# Patient Record
Sex: FEMALE | Race: WHITE | NOT HISPANIC OR LATINO
[De-identification: names, ages, dates, MRNs, and addresses within clinical notes are randomized per-mention and may not be internally consistent; named-entity substitution may affect disease eponyms.]

---
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
## 2021-03-12 ENCOUNTER — APPOINTMENT (OUTPATIENT)
Dept: UROLOGY | Facility: CLINIC | Age: 21
End: 2021-03-12
Payer: COMMERCIAL

## 2021-03-12 VITALS — TEMPERATURE: 98.8 F | DIASTOLIC BLOOD PRESSURE: 70 MMHG | SYSTOLIC BLOOD PRESSURE: 110 MMHG

## 2021-03-12 PROCEDURE — 83986 ASSAY PH BODY FLUID NOS: CPT | Mod: QW

## 2021-03-12 PROCEDURE — 87210 SMEAR WET MOUNT SALINE/INK: CPT | Mod: QW

## 2021-03-12 PROCEDURE — 99215 OFFICE O/P EST HI 40 MIN: CPT

## 2021-03-12 PROCEDURE — 99072 ADDL SUPL MATRL&STAF TM PHE: CPT

## 2021-03-12 RX ORDER — SULFAMETHOXAZOLE AND TRIMETHOPRIM 800; 160 MG/1; MG/1
800-160 TABLET ORAL TWICE DAILY
Qty: 6 | Refills: 0 | Status: COMPLETED | COMMUNITY
Start: 2020-10-23 | End: 2021-03-12

## 2021-03-17 ENCOUNTER — NON-APPOINTMENT (OUTPATIENT)
Age: 21
End: 2021-03-17

## 2021-03-23 ENCOUNTER — NON-APPOINTMENT (OUTPATIENT)
Age: 21
End: 2021-03-23

## 2021-05-05 ENCOUNTER — APPOINTMENT (OUTPATIENT)
Dept: UROLOGY | Facility: CLINIC | Age: 21
End: 2021-05-05
Payer: COMMERCIAL

## 2021-05-05 VITALS — TEMPERATURE: 98.6 F

## 2021-05-05 PROCEDURE — 99215 OFFICE O/P EST HI 40 MIN: CPT

## 2021-05-05 PROCEDURE — 99072 ADDL SUPL MATRL&STAF TM PHE: CPT

## 2021-05-05 RX ORDER — FLUCONAZOLE 150 MG/1
150 TABLET ORAL
Qty: 4 | Refills: 0 | Status: COMPLETED | COMMUNITY
Start: 2021-03-12 | End: 2021-05-05

## 2021-05-05 RX ORDER — METHOCARBAMOL 750 MG/1
750 TABLET, FILM COATED ORAL TWICE DAILY
Qty: 60 | Refills: 2 | Status: COMPLETED | COMMUNITY
Start: 2020-12-04 | End: 2021-05-05

## 2021-05-05 RX ORDER — PHENAZOPYRIDINE HYDROCHLORIDE 200 MG/1
200 TABLET ORAL
Qty: 30 | Refills: 1 | Status: COMPLETED | COMMUNITY
Start: 2020-10-23 | End: 2021-05-05

## 2021-05-05 RX ORDER — DOXYCYCLINE HYCLATE 100 MG/1
100 CAPSULE ORAL TWICE DAILY
Qty: 20 | Refills: 0 | Status: COMPLETED | COMMUNITY
Start: 2021-03-12 | End: 2021-05-05

## 2021-05-28 ENCOUNTER — APPOINTMENT (OUTPATIENT)
Dept: OBGYN | Facility: CLINIC | Age: 21
End: 2021-05-28
Payer: COMMERCIAL

## 2021-05-28 PROCEDURE — 99072 ADDL SUPL MATRL&STAF TM PHE: CPT

## 2021-05-28 PROCEDURE — 99203 OFFICE O/P NEW LOW 30 MIN: CPT

## 2021-06-11 ENCOUNTER — APPOINTMENT (OUTPATIENT)
Dept: UROLOGY | Facility: CLINIC | Age: 21
End: 2021-06-11

## 2021-08-03 ENCOUNTER — APPOINTMENT (OUTPATIENT)
Dept: UROLOGY | Facility: CLINIC | Age: 21
End: 2021-08-03

## 2021-09-14 ENCOUNTER — APPOINTMENT (OUTPATIENT)
Dept: UROGYNECOLOGY | Facility: CLINIC | Age: 21
End: 2021-09-14
Payer: COMMERCIAL

## 2021-09-14 VITALS — TEMPERATURE: 98.1 F | SYSTOLIC BLOOD PRESSURE: 124 MMHG | DIASTOLIC BLOOD PRESSURE: 81 MMHG

## 2021-09-14 PROCEDURE — 99214 OFFICE O/P EST MOD 30 MIN: CPT

## 2021-11-16 ENCOUNTER — APPOINTMENT (OUTPATIENT)
Dept: UROGYNECOLOGY | Facility: CLINIC | Age: 21
End: 2021-11-16
Payer: COMMERCIAL

## 2021-11-16 PROCEDURE — 99214 OFFICE O/P EST MOD 30 MIN: CPT

## 2022-02-15 ENCOUNTER — APPOINTMENT (OUTPATIENT)
Dept: UROGYNECOLOGY | Facility: CLINIC | Age: 22
End: 2022-02-15

## 2022-10-19 ENCOUNTER — APPOINTMENT (OUTPATIENT)
Dept: UROGYNECOLOGY | Facility: CLINIC | Age: 22
End: 2022-10-19

## 2022-10-19 VITALS — SYSTOLIC BLOOD PRESSURE: 119 MMHG | DIASTOLIC BLOOD PRESSURE: 78 MMHG

## 2022-10-19 DIAGNOSIS — B37.31 ACUTE CANDIDIASIS OF VULVA AND VAGINA: ICD-10-CM

## 2022-10-19 PROCEDURE — 87210 SMEAR WET MOUNT SALINE/INK: CPT | Mod: QW

## 2022-10-19 PROCEDURE — 99214 OFFICE O/P EST MOD 30 MIN: CPT

## 2022-10-19 PROCEDURE — 83986 ASSAY PH BODY FLUID NOS: CPT | Mod: QW

## 2022-10-19 RX ORDER — TRIAMCINOLONE ACETONIDE 1 MG/G
0.1 OINTMENT TOPICAL
Qty: 1 | Refills: 0 | Status: COMPLETED | COMMUNITY
Start: 2021-09-14 | End: 2022-10-19

## 2022-10-19 RX ORDER — MONTELUKAST 10 MG/1
10 TABLET, FILM COATED ORAL DAILY
Qty: 30 | Refills: 5 | Status: COMPLETED | COMMUNITY
Start: 2020-10-02 | End: 2022-10-19

## 2022-10-19 NOTE — ASSESSMENT
[FreeTextEntry1] : Wet prep done in office\par pH=4.0, +lacto, +epithelial cells, ++hyphae, no clue cells, no whiff, no trich, no WBC, no parabasal cells\par KOH prep + hyphae\par \par Fluconazole 200mg 1 po q3d x 3 doses.\par \par Retry baclofen 20mg po qam. If unable to tolerate, I told Maile to call me and I will change her muscle relaxant. \par \par I strongly encouraged Maile to start PFPT. She has had issues with lack of insurance coverage. I suggested she speak with [employer] and ask them about paying out of pocket. \par \par RTO 3 months\par

## 2022-10-19 NOTE — PHYSICAL EXAM
[Chaperone Present] : A chaperone was present in the examining room during all aspects of the physical examination [No Acute Distress] : in no acute distress [Well developed] : well developed [Well Nourished] : ~L well nourished [Good Hygeine] : demonstrates good hygeine [Oriented x3] : oriented to person, place, and time [Normal Memory] : ~T memory was ~L unimpaired [Normal Mood/Affect] : mood and affect are normal [Obese] : obese [Warm and Dry] : was warm and dry to touch [Normal Gait] : gait was normal [No Lesions] : no lesions were seen on the external genitalia [Labia Majora] : were normal [Labia Minora] : were normal [Normal Appearance] : general appearance was normal [Pink Rugae] : pink rugae [No Bleeding] : there was no active vaginal bleeding [Normal] : no abnormalities [Exam Deferred] : was deferred [FreeTextEntry1] : Silva ALFORD RN [Tenderness] : ~T no ~M abdominal tenderness observed [Distended] : not distended [de-identified] : Q-tip touch test 2/10 @ 5,6,7. No erythema, ulcerations, erosions [FreeTextEntry4] : PFMs 4/4 with MTrPs b/l levators, all groups; +taut bands, +severe pain to palpation

## 2022-10-19 NOTE — HISTORY OF PRESENT ILLNESS
[FreeTextEntry1] : Maile is here for a f/u visit after a year hiatus. \par \par Not using anything for her vulvar pain or PFD.\par Since her last appointment, not using any topical creams.\par Was taking baclofen 10mg but she couldn't fall asleep. Took x 5-6 months, did not notice significant improvement. \par Tried methocarbamol which didn't help. \par She endorses lower abdominal pressure which makes her feel like she has a UTI. She has been tested repeatedly and all cultures have been negative.\par She recently had an X-RAY of her lower back and pelvis b/c she has been having severe back pain. X-ray demonstrated that her R hip is higher than her L hip.\par \par She has attempted intercourse but was unable to achieve penetration 2/2 pain. She states it feels like her partner is hitting a wall.

## 2022-10-20 ENCOUNTER — NON-APPOINTMENT (OUTPATIENT)
Age: 22
End: 2022-10-20

## 2022-11-08 ENCOUNTER — NON-APPOINTMENT (OUTPATIENT)
Age: 22
End: 2022-11-08

## 2022-11-10 ENCOUNTER — NON-APPOINTMENT (OUTPATIENT)
Age: 22
End: 2022-11-10

## 2022-11-11 LAB
APPEARANCE: CLEAR
BACTERIA: NEGATIVE
BILIRUBIN URINE: NEGATIVE
BLOOD URINE: NEGATIVE
COLOR: NORMAL
GLUCOSE QUALITATIVE U: NEGATIVE
HYALINE CASTS: 0 /LPF
KETONES URINE: NEGATIVE
LEUKOCYTE ESTERASE URINE: NEGATIVE
MICROSCOPIC-UA: NORMAL
NITRITE URINE: NEGATIVE
PH URINE: 7
PROTEIN URINE: NORMAL
RED BLOOD CELLS URINE: 1 /HPF
SPECIFIC GRAVITY URINE: 1.03
SQUAMOUS EPITHELIAL CELLS: 3 /HPF
UROBILINOGEN URINE: NORMAL
WHITE BLOOD CELLS URINE: 2 /HPF

## 2022-11-15 LAB — BACTERIA UR CULT: NORMAL

## 2022-11-22 ENCOUNTER — NON-APPOINTMENT (OUTPATIENT)
Age: 22
End: 2022-11-22

## 2022-11-28 ENCOUNTER — NON-APPOINTMENT (OUTPATIENT)
Age: 22
End: 2022-11-28

## 2022-12-07 ENCOUNTER — APPOINTMENT (OUTPATIENT)
Dept: UROGYNECOLOGY | Facility: CLINIC | Age: 22
End: 2022-12-07

## 2022-12-07 ENCOUNTER — NON-APPOINTMENT (OUTPATIENT)
Age: 22
End: 2022-12-07

## 2023-01-11 ENCOUNTER — APPOINTMENT (OUTPATIENT)
Dept: UROGYNECOLOGY | Facility: CLINIC | Age: 23
End: 2023-01-11
Payer: COMMERCIAL

## 2023-01-11 VITALS — DIASTOLIC BLOOD PRESSURE: 86 MMHG | SYSTOLIC BLOOD PRESSURE: 136 MMHG

## 2023-01-11 DIAGNOSIS — M62.838 OTHER MUSCLE SPASM: ICD-10-CM

## 2023-01-11 PROCEDURE — 83986 ASSAY PH BODY FLUID NOS: CPT | Mod: QW

## 2023-01-11 PROCEDURE — 87210 SMEAR WET MOUNT SALINE/INK: CPT | Mod: QW

## 2023-01-11 PROCEDURE — 99214 OFFICE O/P EST MOD 30 MIN: CPT

## 2023-01-11 RX ORDER — BACLOFEN 20 MG/1
20 TABLET ORAL
Qty: 30 | Refills: 3 | Status: COMPLETED | COMMUNITY
Start: 2021-09-14 | End: 2023-01-11

## 2023-01-11 RX ORDER — FLUCONAZOLE 200 MG/1
200 TABLET ORAL
Qty: 3 | Refills: 0 | Status: COMPLETED | COMMUNITY
Start: 2022-10-19 | End: 2023-01-11

## 2023-01-11 NOTE — HISTORY OF PRESENT ILLNESS
[FreeTextEntry1] : Maile is here for a f/u visit for vulvar irritation and discharge.\par \par Every time she has intercourse she gets infection symptoms (all cultures negative). She reports vulvar swelling, itching, erythema, yellow discharge. \par Stopped using lube and latex condoms. \par She is wearing 100% white cotton underwear.\par She is not using soap or body washes.\par \par She is still having intercourse with some difficulty. She denies any vulvodynia symptoms. She feels her PFMs tighten up and then has pain.\par She tried 0.25mg xanax once prior to intercourse and was able to relax and have pain free sex.\par She does not think baclofen is working for her PFMs.\par She went for consult for PFPT and has first appointment on Friday.\par \par

## 2023-01-11 NOTE — DISCUSSION/SUMMARY
[FreeTextEntry1] : I had a long discussion with Maile today re: irritant contact derm, normal physiologic discharge vs. infection.\par \par Wet prep done in office\par pH=4.0, +lacto, +epithelial cells, no hyphae, no clue cells, no whiff, no trich, no WBC, no parabasal cells\par KOH prep neg hyphae\par BV Affirm sent today.\par \par We discussed alternative non-latex, fragrance and spermicide free condoms she can try.\par Continue genital hygiene measures.\par \par We discussed the effects of altered pH on the vaginal microbiome and risk for urinary and vaginal infections and vulvovaginal pain. I suggested that she use GCL BipHresh suppository PV 2-3x/week, balance wash and restore lubricant. I explained this is not FDA approved, however, she can go on their website and read literature from WHO and NIH regarding it's use in helping to maintain healthy vaginal ecosystem.\par \par White Crisco to vulva as needed for irritation.\par \par Mometasone 0.1% ointment pea-size amt to areas of irritation (labia majora, interlabial sulci) after bath or shower BID x 2 weeks then QD x 4 weeks then 2-3x/week.\par \par Continue PFM relaxation techniques.\par Keep PFPT appointment.\par d/c baclofen\par \par Diazepam 2mg 1 tab po QHS and may take 1 tab 60 mins prior to intercourse. #60, no refills to pharmacy. ISTOP checked. SE including but not limited to dizziness, drowsiness, h/a, constipation, depression, irritability and need to taper off medication rev'd with patient. Maile understands that benzodiazepines are a CS, even in suppository form. She understands the potential for dependence/abuse, drug tolerance, potential for addiction. She understands that this class of drugs is habit-forming and [facility] regulated. The sedative effects of benzos can be potentiated by taking alcohol, sleeping pills, opioids. The decision to drive is the patient's responsibility, as benzos can affect her driving ability and ability to concentrate. The goal is to wean off benzos. The patient verbalized understanding of everything we discussed and would like to continue with medication at this time. She also understands that in order to continue to get refills on her diazepam prescription, she needs to be seen in the office at minimum every 3 months.\par \par \par \par \par \par

## 2023-01-11 NOTE — PHYSICAL EXAM
[No Acute Distress] : in no acute distress [Well developed] : well developed [Well Nourished] : ~L well nourished [Good Hygeine] : demonstrates good hygeine [Oriented x3] : oriented to person, place, and time [Normal Memory] : ~T memory was ~L unimpaired [Normal Mood/Affect] : mood and affect are normal [Anxiety] : patient is anxious [No Edema] : ~T edema was not present [Obese] : obese [Normal Gait] : gait was normal [Normal Appearance] : general appearance was normal [Pink Rugae] : pink rugae [Discharge] : a  ~M vaginal discharge was present [Moderate] : moderate [White] : white [Cheesy] : cheesy [Exam Deferred] : was deferred [Tenderness] : ~T no ~M abdominal tenderness observed [Distended] : not distended [Foul Smelling] : not foul smelling [de-identified] : Q-tip touch test negative. No erythema, no erosions, no ulcerations, no fissures. [de-identified] : Hyperkeratosis b/l labia majora, no hair loss; erythema b/l interlabial sulci and superior aspect of L vulva adjacent to clitoral welch (c/w scratching)

## 2023-01-13 ENCOUNTER — NON-APPOINTMENT (OUTPATIENT)
Age: 23
End: 2023-01-13

## 2023-01-13 LAB
CANDIDA VAG CYTO: NOT DETECTED
G VAGINALIS+PREV SP MTYP VAG QL MICRO: DETECTED
T VAGINALIS VAG QL WET PREP: NOT DETECTED

## 2023-02-02 ENCOUNTER — APPOINTMENT (OUTPATIENT)
Dept: UROGYNECOLOGY | Facility: CLINIC | Age: 23
End: 2023-02-02
Payer: COMMERCIAL

## 2023-02-02 VITALS
OXYGEN SATURATION: 99 % | BODY MASS INDEX: 40.97 KG/M2 | SYSTOLIC BLOOD PRESSURE: 122 MMHG | HEART RATE: 101 BPM | HEIGHT: 67 IN | WEIGHT: 261 LBS | DIASTOLIC BLOOD PRESSURE: 82 MMHG

## 2023-02-02 DIAGNOSIS — N76.0 ACUTE VAGINITIS: ICD-10-CM

## 2023-02-02 DIAGNOSIS — B96.89 ACUTE VAGINITIS: ICD-10-CM

## 2023-02-02 PROCEDURE — 87210 SMEAR WET MOUNT SALINE/INK: CPT | Mod: QW

## 2023-02-02 PROCEDURE — 83986 ASSAY PH BODY FLUID NOS: CPT | Mod: QW

## 2023-02-02 PROCEDURE — 99215 OFFICE O/P EST HI 40 MIN: CPT

## 2023-02-02 RX ORDER — METRONIDAZOLE 7.5 MG/G
0.75 GEL VAGINAL
Qty: 1 | Refills: 0 | Status: COMPLETED | COMMUNITY
Start: 2023-01-13 | End: 2023-02-02

## 2023-02-02 NOTE — HISTORY OF PRESENT ILLNESS
[FreeTextEntry1] : Maile is here for an emergency visit for vulvar irritation and discharge.\par \par Maile has been going to PFPT with Cassandra. \par Skipped PFPT last week b/c of pain. \par No pain with sitting.\par Pain with walking.\par She reports yellow vaginal discharge, odor and severe R external irritation and erythema.\par Stopped using lube and latex condoms. \par She is wearing 100% white cotton underwear.\par She is not using soap or body washes.\par \par She is still having intercourse with some difficulty. She denies any vulvodynia symptoms. She feels her PFMs tighten up and then has pain.\par She is now using diazepam 2mg po qhs and 60 mins prior to intercourse and PFPT which she thinks is helping. Cassandra told her not to use it prior to PFPT and she had severe pain and tightening.\par \par \par \par

## 2023-02-02 NOTE — PHYSICAL EXAM
[No Acute Distress] : in no acute distress [Well developed] : well developed [Well Nourished] : ~L well nourished [Good Hygeine] : demonstrates good hygeine [Oriented x3] : oriented to person, place, and time [Normal Memory] : ~T memory was ~L unimpaired [Normal Mood/Affect] : mood and affect are normal [Anxiety] : patient is anxious [No Edema] : ~T edema was not present [Obese] : obese [Normal Gait] : gait was normal [Normal Appearance] : general appearance was normal [Pink Rugae] : pink rugae [Discharge] : a  ~M vaginal discharge was present [Exam Deferred] : was deferred [Heavy] : heavy [Foul Smelling] : foul smelling [Zafar] : yellow [No Bleeding] : there was no active vaginal bleeding [Watery] : watery [Chaperone Present] : A chaperone was present in the examining room during all aspects of the physical examination [FreeTextEntry1] : BEHZAD Ratliff [Tenderness] : ~T no ~M abdominal tenderness observed [Distended] : not distended [de-identified] : Q-tip touch test 4/10 @ 5,6,7. +erythema at introitus, no erosions, no ulcerations, no fissures. [de-identified] : Hyperkeratosis b/l labia majora, no hair loss; erythema R interlabial sulci and superior aspect of R vulva adjacent to clitoral welch

## 2023-02-02 NOTE — DISCUSSION/SUMMARY
[FreeTextEntry1] : I had a long discussion with Maile today re: RBV\par \par Wet prep done in office\par pH=5.0, few lacto, rare epithelial cells, no hyphae, ++clue cells, ++whiff, no trich, no WBC, no parabasal cells\par KOH prep neg hyphae\par BV Affirm sent today.\par \par Continue genital hygiene measures.\par \par We discussed the effects of altered pH on the vaginal microbiome and risk for urinary and vaginal infections and vulvovaginal pain. I suggested that she use GCL BipHresh suppository PV 2-3x/week, balance wash and restore lubricant. I explained this is not FDA approved, however, she can go on their website and read literature from WHO and NIH regarding it's use in helping to maintain healthy vaginal ecosystem.\par \par White Crisco to vulva as needed for irritation.\par \par May continue Mometasone 0.1% ointment pea-size amt to areas of irritation (labia majora, interlabial sulci) after bath or shower QD x 4 weeks then 2-3x/week.\par \par Metronidazole 500mg 1 po BID x 7d, take with food. AE including but not limited to h/a, n/v/d, metallic taste rev'd with patient.\par and \par [id number] suppositories PV qhs x 30d. I stressed to Maile to avoid receptive oral sex while on BA suppositories. I also stressed that she should refrain from P-V intercourse, but if she does have intercourse, she needs to use a condom. \par \par Written information on RBV and prevention methods given.\par \par Continue PFM relaxation techniques.\par Cancel  PFPT appointment tomorrow. Phone call placed to Alma Reid regarding her PT appointments.\par \par Continue Diazepam 2mg as instructed at previous visit. \par \par RTO 6 weeks\par \par \par \par

## 2023-02-04 LAB
CANDIDA VAG CYTO: NOT DETECTED
G VAGINALIS+PREV SP MTYP VAG QL MICRO: NOT DETECTED
T VAGINALIS VAG QL WET PREP: NOT DETECTED

## 2023-03-15 ENCOUNTER — APPOINTMENT (OUTPATIENT)
Dept: UROGYNECOLOGY | Facility: CLINIC | Age: 23
End: 2023-03-15
Payer: COMMERCIAL

## 2023-03-15 VITALS
SYSTOLIC BLOOD PRESSURE: 137 MMHG | OXYGEN SATURATION: 99 % | DIASTOLIC BLOOD PRESSURE: 87 MMHG | TEMPERATURE: 98.2 F | HEART RATE: 98 BPM

## 2023-03-15 DIAGNOSIS — F41.9 ANXIETY DISORDER, UNSPECIFIED: ICD-10-CM

## 2023-03-15 DIAGNOSIS — L29.2 PRURITUS VULVAE: ICD-10-CM

## 2023-03-15 PROCEDURE — 99214 OFFICE O/P EST MOD 30 MIN: CPT

## 2023-03-15 RX ORDER — FLUCONAZOLE 200 MG/1
200 TABLET ORAL
Qty: 1 | Refills: 0 | Status: COMPLETED | COMMUNITY
Start: 2023-01-13 | End: 2023-03-15

## 2023-03-15 RX ORDER — METRONIDAZOLE 500 MG/1
500 TABLET ORAL
Qty: 14 | Refills: 0 | Status: COMPLETED | COMMUNITY
Start: 2023-02-02 | End: 2023-03-15

## 2023-03-15 NOTE — DISCUSSION/SUMMARY
[FreeTextEntry1] : I had a long discussion with Maile today re: RBV, vulvar irritaiton, PFD\par \par Continue genital hygiene measures.\par Continue GCL BiopHresh and Balance wash.\par White Crisco to vulva as needed for irritation.\par \par Resume Mometasone 0.1% ointment pea-size amt to areas of irritation (labia majora, interlabial sulci) after bath or shower 2x/week.\par \par [id number] suppositories PV after menses x 2d and then after after intercourse x 1d. I stressed to Maile to avoid receptive oral sex while on BA suppositories. I also stressed that she should refrain from P-V intercourse, but if she does have intercourse, she needs to use a condom. \par \par Continue PFPT with Alma weekly.\par Continue PFM relaxation techniques.\par Continue dilation therapy.\par \par Increase diazepam 2mg to BID. #60, no refills ordered from pharmacy. ISTOP checked.\par \par ETA topical cream ftp to introitus qhs. Amount and location of cream application was demonstrated to patient today in office via mirror demonstration. I explained that the cream does not work overnight and she needs to continue it for a minimum of 6-8 weeks before we re-evaluate efficacy.\par \par RTO 2 months\par \par

## 2023-03-15 NOTE — PHYSICAL EXAM
[No Acute Distress] : in no acute distress [Well developed] : well developed [Well Nourished] : ~L well nourished [Good Hygeine] : demonstrates good hygeine [Oriented x3] : oriented to person, place, and time [Normal Memory] : ~T memory was ~L unimpaired [Normal Mood/Affect] : mood and affect are normal [Anxiety] : patient is anxious [No Edema] : ~T edema was not present [Obese] : obese [Normal Gait] : gait was normal [Normal Appearance] : general appearance was normal [Pink Rugae] : pink rugae [Heavy] : there was heavy vaginal bleeding [Normal] : no abnormalities [Exam Deferred] : was deferred [Tenderness] : ~T no ~M abdominal tenderness observed [Distended] : not distended [de-identified] : Q-tip touch test 5/10 @ 5,6,7. +erythema at introitus, no erosions, no ulcerations, no fissures. [de-identified] : Mild hyperkeratosis b/l labia majora, no hair loss; erythema R interlabial sulci and superior aspect of R vulva adjacent to clitoral wlech [FreeTextEntry4] : PFMs 3/4 b/l levators, all groups and b/l transverse perineii; +taut bands, +pain to palpation; +heavy menses

## 2023-03-15 NOTE — DISCUSSION/SUMMARY
[FreeTextEntry1] : I had a long discussion with Maile today re: RBV, vulvar irritaiton, PFD\par \par Continue genital hygiene measures.\par Continue GCL BiopHresh and Balance wash.\par White Crisco to vulva as needed for irritation.\par \par Resume Mometasone 0.1% ointment pea-size amt to areas of irritation (labia majora, interlabial sulci) after bath or shower 2x/week.\par \par [id number] suppositories PV after menses x 2d and then after after intercourse x 1d. I stressed to Maile to avoid receptive oral sex while on BA suppositories. I also stressed that she should refrain from P-V intercourse, but if she does have intercourse, she needs to use a condom. \par \par Continue PFPT with Alma weekly.\par Continue PFM relaxation techniques.\par Continue dilation therapy.\par \par Increase diazepam 2mg to BID. #60, no refills ordered from pharmacy. ISTOP checked.\par \par ETA topical cream ftp to introitus qhs. Amount and location of cream application was demonstrated to patient today in office via mirror demonstration. I explained that the cream does not work overnight and she needs to continue it for a minimum of 6-8 weeks before we re-evaluate efficacy.\par \par RTO 2 months\par \par

## 2023-03-15 NOTE — HISTORY OF PRESENT ILLNESS
[FreeTextEntry1] : Maile is here for a f/u visit.\par \par She currently has her menses very heavy.\par Maile has been going to PFPT with Alma.\par She has started dilation therapy. She is on #2 and #3 with some discomfort with initial insertion.\par \par She has been taking diazepam 2mg po qhs and 1 hour prior to PFPT. She feels it is helping a great deal. She thinks it is not only helping her PFD, but also her OCD and anxiety (which in turn makes her PFD worse).\par No pain with sitting.\par Pain with walking.\par She feels like she has a cut on the R side of her vulva.\par Stopped using lube and latex condoms. \par She is wearing 100% white cotton underwear.\par She is not using soap or body washes.\par \par She is still having intercourse with some difficulty. She denies any vulvodynia symptoms. She feels her PFMs tighten up and then has pain.\par \par \par \par \par \par

## 2023-03-15 NOTE — PHYSICAL EXAM
[No Acute Distress] : in no acute distress [Well developed] : well developed [Well Nourished] : ~L well nourished [Good Hygeine] : demonstrates good hygeine [Oriented x3] : oriented to person, place, and time [Normal Memory] : ~T memory was ~L unimpaired [Normal Mood/Affect] : mood and affect are normal [Anxiety] : patient is anxious [No Edema] : ~T edema was not present [Obese] : obese [Normal Gait] : gait was normal [Normal Appearance] : general appearance was normal [Pink Rugae] : pink rugae [Heavy] : there was heavy vaginal bleeding [Normal] : no abnormalities [Exam Deferred] : was deferred [Tenderness] : ~T no ~M abdominal tenderness observed [Distended] : not distended [de-identified] : Q-tip touch test 5/10 @ 5,6,7. +erythema at introitus, no erosions, no ulcerations, no fissures. [de-identified] : Mild hyperkeratosis b/l labia majora, no hair loss; erythema R interlabial sulci and superior aspect of R vulva adjacent to clitoral welch [FreeTextEntry4] : PFMs 3/4 b/l levators, all groups and b/l transverse perineii; +taut bands, +pain to palpation; +heavy menses

## 2023-05-31 ENCOUNTER — APPOINTMENT (OUTPATIENT)
Dept: UROGYNECOLOGY | Facility: CLINIC | Age: 23
End: 2023-05-31
Payer: COMMERCIAL

## 2023-05-31 VITALS
WEIGHT: 260 LBS | DIASTOLIC BLOOD PRESSURE: 76 MMHG | HEIGHT: 67 IN | TEMPERATURE: 98.3 F | BODY MASS INDEX: 40.81 KG/M2 | SYSTOLIC BLOOD PRESSURE: 112 MMHG

## 2023-05-31 PROCEDURE — 99214 OFFICE O/P EST MOD 30 MIN: CPT

## 2023-05-31 NOTE — DISCUSSION/SUMMARY
[FreeTextEntry1] : I had a long discussion with Maiel today re: RBV, vulvar derm, PFD.\par \par Continue genital hygiene measures.\par Continue GCL BiopHresh and Balance wash.\par White Crisco to vulva as needed for irritation.\par \par Decrease Mometasone 0.1% ointment pea-size amt to areas of irritation (labia majora, interlabial sulci) after bath or shower to prn.\par \par [id number] suppositories PV after menses x 2d and then after after intercourse x 1d. I stressed to Maile to avoid receptive oral sex while on BA suppositories. I also stressed that she should refrain from P-V intercourse, but if she does have intercourse, she needs to use a condom. \par \par Continue PFPT with Alma q 2 weeks.\par Continue PFM relaxation techniques.\par Continue dilation therapy.\par \par Continue diazepam 2mg to BID and prior to PFPT. \par \par Affirm sent per pt request.\par \par RTO 6 weeks\par \par

## 2023-05-31 NOTE — HISTORY OF PRESENT ILLNESS
[FreeTextEntry1] : Maile is here for a f/u visit.\par \par Maile has been going to PFPT with Alma every 2 weeks.\par She has started dilation therapy. She is on the largest size dilator without pain.\par \par She has been taking diazepam 2mg po qhs and 1 hour prior to PFPT. She feels it is helping a great deal. She thinks it is not only helping her PFD, but also her OCD and anxiety (which in turn makes her PFD worse).\par No pain with sitting.\par Pain with walking has improved.\par \par Stopped using lube and latex condoms. \par She is wearing 100% white cotton underwear.\par She is not using soap or body washes.\par She is using [id number] suppositories prn when she feels like she has a vaginal infection. She uses after menses. She feels this is one of the only treatments that has helped her "vaginal infections". \par She is not using topical ETA.\par \par She is still having intercourse with some difficulty. She denies any vulvodynia symptoms. She feels her PFMs tighten up and then has pain.\par \par She is moving to [place], [place] mid-July.\par \par \par \par \par

## 2023-05-31 NOTE — PHYSICAL EXAM
[No Acute Distress] : in no acute distress [Well developed] : well developed [Well Nourished] : ~L well nourished [Good Hygeine] : demonstrates good hygeine [Oriented x3] : oriented to person, place, and time [Normal Memory] : ~T memory was ~L unimpaired [Normal Mood/Affect] : mood and affect are normal [Anxiety] : patient is anxious [No Edema] : ~T edema was not present [Obese] : obese [Normal Gait] : gait was normal [Normal Appearance] : general appearance was normal [Pink Rugae] : pink rugae [Heavy] : there was heavy vaginal bleeding [Exam Deferred] : was deferred [No Lesions] : no lesions were seen on the external genitalia [Labia Minora] : were normal [Normal] : was normal [Tenderness] : ~T no ~M abdominal tenderness observed [Distended] : not distended [de-identified] : Q-tip touch test negative. No erythema, ulcerations, fissures, erosions. [de-identified] : Mild hyperkeratosis b/l labia majora, no hair loss [FreeTextEntry4] : PFMs 2-3/4 b/l levators, all groups and b/l transverse perineii; +taut bands, mild discomfort to palpation; appreciably improved compared to previous exams.

## 2023-06-30 ENCOUNTER — APPOINTMENT (OUTPATIENT)
Dept: UROGYNECOLOGY | Facility: CLINIC | Age: 23
End: 2023-06-30
Payer: COMMERCIAL

## 2023-06-30 VITALS — DIASTOLIC BLOOD PRESSURE: 71 MMHG | TEMPERATURE: 98.5 F | SYSTOLIC BLOOD PRESSURE: 114 MMHG

## 2023-06-30 DIAGNOSIS — N39.0 URINARY TRACT INFECTION, SITE NOT SPECIFIED: ICD-10-CM

## 2023-06-30 PROCEDURE — 99215 OFFICE O/P EST HI 40 MIN: CPT

## 2023-06-30 RX ORDER — MOMETASONE FUROATE 1 MG/G
0.1 OINTMENT TOPICAL
Qty: 1 | Refills: 0 | Status: COMPLETED | COMMUNITY
Start: 2023-01-11 | End: 2023-06-30

## 2023-06-30 RX ORDER — HYDROCORTISONE ACETATE 25 MG/1
25 SUPPOSITORY RECTAL
Qty: 1 | Refills: 1 | Status: ACTIVE | COMMUNITY
Start: 2023-06-30 | End: 1900-01-01

## 2023-06-30 NOTE — PHYSICAL EXAM
[No Acute Distress] : in no acute distress [Well developed] : well developed [Well Nourished] : ~L well nourished [Good Hygeine] : demonstrates good hygeine [Oriented x3] : oriented to person, place, and time [Normal Memory] : ~T memory was ~L unimpaired [Normal Mood/Affect] : mood and affect are normal [Anxiety] : patient is anxious [No Edema] : ~T edema was not present [Obese] : obese [Normal Gait] : gait was normal [No Lesions] : no lesions were seen on the external genitalia [Labia Minora] : were normal [Normal Appearance] : general appearance was normal [Pink Rugae] : pink rugae [Heavy] : there was heavy vaginal bleeding [Normal] : no abnormalities [Exam Deferred] : was deferred [Tenderness] : ~T no ~M abdominal tenderness observed [Distended] : not distended [de-identified] : Q-tip touch test negative. No erythema, ulcerations, fissures, erosions. [de-identified] : Mild hyperkeratosis b/l labia majora, no hair loss [FreeTextEntry4] : PFMs 2-3/4 b/l IC/C with taut bands, mild discomfort to palpation; appreciably improved compared to previous exams.

## 2023-06-30 NOTE — DISCUSSION/SUMMARY
[FreeTextEntry1] : Affirm sent.\par UA and C&S sent. Offered to straight shane Gr but she declined and preferred to leave a clean catch specimen.\par \par Continue genital hygiene measures.\par Continue GCL BiopHresh and Balance wash.\par White Crisco to vulva as needed for irritation.\par \par Continue [id number] suppositories PV after menses x 2d and then after after intercourse x 1d. I stressed to Maile to avoid receptive oral sex while on BA suppositories. I also stressed that she should refrain from P-V intercourse, but if she does have intercourse, she needs to use a condom. \par \par Continue PFPT when she gets to [place].\par Continue PFM relaxation techniques.\par Continue dilation therapy.\par \par Continue diazepam 2mg to BID and prior to PFPT. #120, no refills sent to pharmacy. ISTOP checked.\par \par Maile asked me to show her again how to use her dilators. Was able to insert #3 fully, however she did c/o posterior vaginal burning at tip of dilator. Was then able to insert #4 1/2 way before Maile reported pain. Rev'd dilation therapy at length.\par \par RTO 3 months\par \par

## 2023-06-30 NOTE — HISTORY OF PRESENT ILLNESS
[FreeTextEntry1] : Maile is here for a f/u visit.\par \par 3 weeks ago she thought she had a UTI, went to Urgent Care and was treated with macrobid, no culture. She did not have any dysuria, frequency, urgency, hematuria. She only had some increased bladder pressure. \par \par Maile has not been going to PFPT b/c of work with pain/burning in her posterior vagina. She is unable to increase the size of the dilator.  \par She noticed vaginal discharge on dilator and now thinks she has VVC. She denies itching, odor, pain. \par \par She has been taking diazepam 2mg po qhs and 1 hour prior to PFPT. She feels it is helping a great deal. She thinks it is not only helping her PFD, but also her OCD and anxiety (which in turn makes her PFD worse).\par No pain with sitting.\par Pain with walking has improved.\par \par Stopped using lube and latex condoms. \par She is wearing 100% white cotton underwear.\par She is not using soap or body washes.\par She is using [id number] suppositories prn after menses and after intercourse. She feels this is one of the only treatments that has helped her "vaginal infections". \par She is not using topical ETA.\par She is not using mometasone.\par \par She is still having intercourse with some difficulty. She denies any vulvodynia symptoms. She feels her PFMs tighten up and then has pain. Last week attempted to have intercourse. She did not have pain but it feels like her boyfriend is hitting a wall. \par She is moving to [place], [place] mid-July.\par \par \par \par \par

## 2023-07-03 ENCOUNTER — NON-APPOINTMENT (OUTPATIENT)
Age: 23
End: 2023-07-03

## 2023-07-11 LAB
APPEARANCE: CLEAR
BACTERIA UR CULT: NORMAL
BACTERIA: NEGATIVE /HPF
BILIRUBIN URINE: NEGATIVE
BLOOD URINE: NEGATIVE
CANDIDA VAG CYTO: NOT DETECTED
CAST: 0 /LPF
COLOR: YELLOW
EPITHELIAL CELLS: 1 /HPF
G VAGINALIS+PREV SP MTYP VAG QL MICRO: NOT DETECTED
GLUCOSE QUALITATIVE U: NEGATIVE MG/DL
KETONES URINE: NEGATIVE MG/DL
LEUKOCYTE ESTERASE URINE: NEGATIVE
MICROSCOPIC-UA: NORMAL
NITRITE URINE: NEGATIVE
PH URINE: 6
PROTEIN URINE: NEGATIVE MG/DL
RED BLOOD CELLS URINE: 1 /HPF
SPECIFIC GRAVITY URINE: 1.03
T VAGINALIS VAG QL WET PREP: NOT DETECTED
UROBILINOGEN URINE: 0.2 MG/DL
WHITE BLOOD CELLS URINE: 0 /HPF

## 2023-10-11 ENCOUNTER — APPOINTMENT (OUTPATIENT)
Dept: UROGYNECOLOGY | Facility: CLINIC | Age: 23
End: 2023-10-11
Payer: COMMERCIAL

## 2023-10-11 PROCEDURE — 99215 OFFICE O/P EST HI 40 MIN: CPT

## 2023-10-11 RX ORDER — TRIAMCINOLONE ACETONIDE 1 MG/G
0.1 OINTMENT TOPICAL
Qty: 1 | Refills: 1 | Status: ACTIVE | COMMUNITY
Start: 2023-10-11 | End: 1900-01-01

## 2023-12-22 ENCOUNTER — APPOINTMENT (OUTPATIENT)
Dept: UROGYNECOLOGY | Facility: CLINIC | Age: 23
End: 2023-12-22
Payer: COMMERCIAL

## 2023-12-22 VITALS — DIASTOLIC BLOOD PRESSURE: 85 MMHG | SYSTOLIC BLOOD PRESSURE: 139 MMHG | TEMPERATURE: 98.1 F

## 2023-12-22 DIAGNOSIS — N94.10 UNSPECIFIED DYSPAREUNIA: ICD-10-CM

## 2023-12-22 PROCEDURE — 99214 OFFICE O/P EST MOD 30 MIN: CPT

## 2023-12-22 NOTE — DISCUSSION/SUMMARY
[FreeTextEntry1] : Affirm sent. Continue genital hygiene measures. Continue GCL BiopHresh and Balance wash. White Crisco to vulva as needed for irritation.  Continue [id number] suppositories PV after menses x 2d and then after after intercourse x 1d. I stressed to Maile to avoid receptive oral sex while on BA suppositories. I also stressed that she should refrain from P-V intercourse, but if she does have intercourse, she needs to use a condom.   Continue PFM relaxation techniques. Resume dilation therapy 2x/week. Continue diazepam 2mg tabs, 2 tabs up to BID prn and prior to PFPT.  Triamcinolone 0.1% ointment pea-size amt to R interlabial sulcus 2x/week and post-coitally.  RTO 3-4 months

## 2023-12-22 NOTE — HISTORY OF PRESENT ILLNESS
[FreeTextEntry1] : Maile is here for a f/u visit. She is doing great!  Maile is not going to PFPT. She has not been dilating but is having intercourse frequently without any entry or deep dyspareunia. She denies vaginal discharge, itching, odor, pain. She is requesting vaginal culture today.  She has been taking diazepam 4mg po prn only. She feels it is helping a great deal. She thinks it is not only helping her PFD, but also her OCD and anxiety (which in turn makes her PFD worse). No pain with sitting. Pain with walking has improved.  Stopped using lube and latex condoms.  She is wearing 100% white cotton underwear. She is not using soap or body washes. She is using [id number] suppositories prn after menses and after intercourse. She feels this is one of the only treatments that has helped her "vaginal infections".  She states that she has some mild erythema and irritation of R interlabial sulcus post-coitally, however, not as severe as she has had in the past. She is not using topical steroids. She is not using topical ETA. She denies vulvodynia symptoms. She denies UTI symptoms.   She is living in [place], [place]

## 2023-12-22 NOTE — PHYSICAL EXAM
[No Acute Distress] : in no acute distress [Well developed] : well developed [Well Nourished] : ~L well nourished [Good Hygeine] : demonstrates good hygeine [Oriented x3] : oriented to person, place, and time [Normal Memory] : ~T memory was ~L unimpaired [Normal Mood/Affect] : mood and affect are normal [Anxiety] : patient is anxious [No Edema] : ~T edema was not present [Obese] : obese [Normal Gait] : gait was normal [No Lesions] : no lesions were seen on the external genitalia [Labia Minora] : were normal [Normal Appearance] : general appearance was normal [Pink Rugae] : pink rugae [Heavy] : there was heavy vaginal bleeding [Normal] : no abnormalities [Exam Deferred] : was deferred [Tenderness] : ~T no ~M abdominal tenderness observed [Distended] : not distended [de-identified] : Q-tip touch test negative. No erythema, ulcerations, fissures, erosions. [de-identified] : Mild hyperkeratosis b/l labia majora, no hair loss [FreeTextEntry4] : PFMs supple, nontender to palpation; appreciably improved compared to previous exams.

## 2023-12-26 ENCOUNTER — NON-APPOINTMENT (OUTPATIENT)
Age: 23
End: 2023-12-26

## 2023-12-26 RX ORDER — FLUCONAZOLE 200 MG/1
200 TABLET ORAL
Qty: 1 | Refills: 0 | Status: ACTIVE | COMMUNITY
Start: 2023-12-26

## 2023-12-26 RX ORDER — METRONIDAZOLE 7.5 MG/G
0.75 GEL VAGINAL
Qty: 1 | Refills: 0 | Status: ACTIVE | COMMUNITY
Start: 2023-12-26

## 2024-01-02 ENCOUNTER — NON-APPOINTMENT (OUTPATIENT)
Age: 24
End: 2024-01-02

## 2024-03-15 ENCOUNTER — APPOINTMENT (OUTPATIENT)
Dept: UROGYNECOLOGY | Facility: CLINIC | Age: 24
End: 2024-03-15
Payer: COMMERCIAL

## 2024-03-15 VITALS — DIASTOLIC BLOOD PRESSURE: 86 MMHG | TEMPERATURE: 98.4 F | SYSTOLIC BLOOD PRESSURE: 127 MMHG

## 2024-03-15 PROCEDURE — 99214 OFFICE O/P EST MOD 30 MIN: CPT

## 2024-03-15 RX ORDER — BORIC ACID
POWDER (GRAM) MISCELLANEOUS
Qty: 60 | Refills: 1 | Status: ACTIVE | COMMUNITY
Start: 2023-02-02 | End: 1900-01-01

## 2024-03-15 NOTE — DISCUSSION/SUMMARY
[FreeTextEntry1] : Aptima sent. Continue genital hygiene measures. Continue GCL BiopHresh and Balance wash. White Crisco to vulva as needed for irritation.  Continue [id number] suppositories PV after menses x 2d and then after after intercourse x 1d. I stressed to Maile to avoid receptive oral sex while on BA suppositories. I also stressed that she should refrain from P-V intercourse, but if she does have intercourse, she needs to use a condom.   Continue PFM relaxation techniques. Resume dilation therapy 2x/week. Continue diazepam 2mg tabs, 2 tabs up to BID prn and prior to PFPT, #120, no refills sent to pharmacy. ISTOP checked.  RTO 3 months

## 2024-03-15 NOTE — HISTORY OF PRESENT ILLNESS
[FreeTextEntry1] : Maile is here for a f/u visit. She is doing great!  She was treated for BV after last visti.  Maile is not going to PFPT. She has not been dilating but is having intercourse frequently without any deep dyspareunia. She denies vaginal discharge, itching, odor, pain. She is requesting vaginal culture today.  She has been taking diazepam 4mg po qhs b/c her PFMs became tighter after BV. She has also been under increased stress. She feels it is helping a great deal. She thinks it is not only helping her PFD, but also her OCD and anxiety (which in turn makes her PFD worse). Has some pain with prolonged sitting (long car rides 5+ hour), otherwise no pain with sitting. Pain with walking has improved.  Stopped using lube and latex condoms.  She is wearing 100% white cotton underwear. She is not using soap or body washes. She is using [id number] suppositories prn after menses and after intercourse. She feels this is one of the only treatments that has helped her "vaginal infections".   She is not using topical steroids. She is not using topical ETA. Reports some mild "burning" with intercourse.  She denies vulvodynia symptoms. She denies UTI symptoms.   She is living in [place], [place]

## 2024-03-15 NOTE — PHYSICAL EXAM
[No Acute Distress] : in no acute distress [Well developed] : well developed [Well Nourished] : ~L well nourished [Good Hygeine] : demonstrates good hygeine [Oriented x3] : oriented to person, place, and time [Normal Memory] : ~T memory was ~L unimpaired [Normal Mood/Affect] : mood and affect are normal [Anxiety] : patient is anxious [No Edema] : ~T edema was not present [Obese] : obese [Normal Gait] : gait was normal [No Lesions] : no lesions were seen on the external genitalia [Labia Minora] : were normal [Normal Appearance] : general appearance was normal [Pink Rugae] : pink rugae [Heavy] : there was heavy vaginal bleeding [Normal] : no abnormalities [Exam Deferred] : was deferred [Distended] : not distended [Tenderness] : ~T no ~M abdominal tenderness observed [de-identified] : Q-tip touch test negative. No erythema, ulcerations, fissures, erosions. [de-identified] : Mild hyperkeratosis b/l labia majora, no hair loss [FreeTextEntry4] : PFMs supple, with mild tender points b/l IC/PC

## 2024-03-20 LAB
A VAGINAE DNA VAG QL NAA+PROBE: NORMAL
BVAB2 DNA VAG QL NAA+PROBE: NORMAL
C KRUSEI DNA VAG QL NAA+PROBE: NEGATIVE
CANDIDA DNA VAG QL NAA+PROBE: NEGATIVE
MEGA1 DNA VAG QL NAA+PROBE: NORMAL
T VAGINALIS RRNA SPEC QL NAA+PROBE: NEGATIVE

## 2024-05-15 ENCOUNTER — APPOINTMENT (OUTPATIENT)
Dept: UROGYNECOLOGY | Facility: CLINIC | Age: 24
End: 2024-05-15
Payer: COMMERCIAL

## 2024-05-15 VITALS — DIASTOLIC BLOOD PRESSURE: 91 MMHG | SYSTOLIC BLOOD PRESSURE: 134 MMHG | TEMPERATURE: 98.2 F

## 2024-05-15 DIAGNOSIS — M62.89 OTHER SPECIFIED DISORDERS OF MUSCLE: ICD-10-CM

## 2024-05-15 DIAGNOSIS — N94.819 VULVODYNIA, UNSPECIFIED: ICD-10-CM

## 2024-05-15 DIAGNOSIS — M79.18 MYALGIA, OTHER SITE: ICD-10-CM

## 2024-05-15 DIAGNOSIS — N89.8 OTHER SPECIFIED NONINFLAMMATORY DISORDERS OF VAGINA: ICD-10-CM

## 2024-05-15 DIAGNOSIS — L98.9 DISORDER OF THE SKIN AND SUBCUTANEOUS TISSUE, UNSPECIFIED: ICD-10-CM

## 2024-05-15 PROCEDURE — 99214 OFFICE O/P EST MOD 30 MIN: CPT

## 2024-05-15 RX ORDER — DIAZEPAM 2 MG/1
2 TABLET ORAL
Qty: 120 | Refills: 0 | Status: ACTIVE | COMMUNITY
Start: 2023-01-11 | End: 1900-01-01

## 2024-05-15 NOTE — DISCUSSION/SUMMARY
[FreeTextEntry1] : Aptima sent. Affirm sent. Continue genital hygiene measures. Continue GCL BiopHresh and Balance wash. White Crisco to vulva as needed for irritation.  Continue [id number] suppositories PV after menses x 2d and then after after intercourse x 1d. I stressed to Maile to avoid receptive oral sex while on BA suppositories. I also stressed that she should refrain from P-V intercourse, but if she does have intercourse, she needs to use a condom.   Continue PFM relaxation techniques. Resume dilation therapy 2x/week. Continue diazepam 2mg tabs, 2 tabs up to BID prn and prior to PFPT, #120, no refills sent to pharmacy. ISTOP checked.  RTO 3-4 months

## 2024-05-15 NOTE — HISTORY OF PRESENT ILLNESS
[FreeTextEntry1] : Maile is here for a f/u visit. She is doing great!   Maile is not going to PFPT. She has not been dilating but is having intercourse frequently without any deep dyspareunia. She denies vaginal discharge, itching, odor, pain. She is requesting vaginal culture today.  She has been taking diazepam 4mg po hs  4-5x/week. She feels it is helping a great deal. She thinks it is not only helping her PFD, but also her OCD and anxiety (which in turn makes her PFD worse).  Has some pain with prolonged sitting (long car rides 5+ hour), otherwise no pain with sitting. Pain with walking has improved.  Stopped using lube and latex condoms.  She is wearing 100% white cotton underwear. She is not using soap or body washes. She is using [id number] suppositories prn after menses and after intercourse. She feels this is one of the only treatments that has helped her "vaginal infections".   She is not using topical steroids. She is not using topical ETA. She denies vulvodynia symptoms. She denies UTI symptoms.   She is living in [place], [place]

## 2024-05-15 NOTE — PHYSICAL EXAM
[No Acute Distress] : in no acute distress [Well developed] : well developed [Well Nourished] : ~L well nourished [Good Hygeine] : demonstrates good hygeine [Oriented x3] : oriented to person, place, and time [Normal Memory] : ~T memory was ~L unimpaired [Normal Mood/Affect] : mood and affect are normal [Anxiety] : patient is anxious [No Edema] : ~T edema was not present [Obese] : obese [Normal Gait] : gait was normal [No Lesions] : no lesions were seen on the external genitalia [Labia Minora] : were normal [Normal Appearance] : general appearance was normal [Pink Rugae] : pink rugae [Normal] : no abnormalities [Exam Deferred] : was deferred [No Bleeding] : there was no active vaginal bleeding [Tenderness] : ~T no ~M abdominal tenderness observed [Distended] : not distended [de-identified] : Q-tip touch test negative. No erythema, ulcerations, fissures, erosions. [de-identified] : Mild hyperkeratosis b/l labia majora, no hair loss [FreeTextEntry4] : PFMs supple, with mild tender points L PC/IC

## 2024-08-30 ENCOUNTER — APPOINTMENT (OUTPATIENT)
Dept: UROGYNECOLOGY | Facility: CLINIC | Age: 24
End: 2024-08-30
Payer: COMMERCIAL

## 2024-08-30 VITALS — TEMPERATURE: 98.2 F | SYSTOLIC BLOOD PRESSURE: 135 MMHG | DIASTOLIC BLOOD PRESSURE: 81 MMHG

## 2024-08-30 DIAGNOSIS — M62.89 OTHER SPECIFIED DISORDERS OF MUSCLE: ICD-10-CM

## 2024-08-30 DIAGNOSIS — N89.8 OTHER SPECIFIED NONINFLAMMATORY DISORDERS OF VAGINA: ICD-10-CM

## 2024-08-30 DIAGNOSIS — N94.819 VULVODYNIA, UNSPECIFIED: ICD-10-CM

## 2024-08-30 DIAGNOSIS — M79.18 MYALGIA, OTHER SITE: ICD-10-CM

## 2024-08-30 DIAGNOSIS — L98.9 DISORDER OF THE SKIN AND SUBCUTANEOUS TISSUE, UNSPECIFIED: ICD-10-CM

## 2024-08-30 DIAGNOSIS — N39.0 URINARY TRACT INFECTION, SITE NOT SPECIFIED: ICD-10-CM

## 2024-08-30 PROCEDURE — 99214 OFFICE O/P EST MOD 30 MIN: CPT

## 2024-08-30 NOTE — HISTORY OF PRESENT ILLNESS
[FreeTextEntry1] : Maile is here for a f/u visit. She continues to do well.  Maile is not going to PFPT. She has not been dilating but is having intercourse frequently without any deep dyspareunia. She denies vaginal discharge, itching, odor, pain. She is requesting vaginal culture today.  She has not been taking diazepam 4mg po hs but states it does help mitigate her PFM spasms and LBP when she takes it. She thinks it not only helps her PFD, but also her OCD and anxiety (which in turn makes her PFD worse).  Has some pain with prolonged sitting (long car rides 5+ hour), otherwise no pain with sitting. Pain with walking has improved.  Stopped using lube and latex condoms.  She is wearing 100% white cotton underwear. She is not using soap or body washes. She is using [id number] suppositories prn after menses and after intercourse. She feels this is one of the only treatments that has helped her "vaginal infections".   She is not using topical steroids. She is not using topical ETA. She denies vulvodynia symptoms.  Has been having increased bladder pressure just prior to menses then resolves. Slightly increased frequency, no urgency, dysuria, fever, chills, n/v, CVAT, hematuria.  She took pyridium but it makes her very nauseated.  She was tested numerous times for UTI in [place] and states all cultures proved negative.  She is living in [place], [place]

## 2024-08-30 NOTE — DISCUSSION/SUMMARY
[FreeTextEntry1] : UA and C&S sent today. Affirm sent. Continue genital hygiene measures. Continue GCL BiopHresh and Balance wash. White Crisco to vulva as needed for irritation.  Continue [id number] suppositories PV after menses x 2d and then after after intercourse x 1d. I stressed to Maile to avoid receptive oral sex while on BA suppositories. I also stressed that she should refrain from P-V intercourse, but if she does have intercourse, she needs to use a condom.   Continue PFM relaxation techniques. Resume dilation therapy 2x/week. Continue diazepam 2mg tabs, 2 tabs up to BID prn.  Trial Uribel 1 tab up to BID as needed. If she finds it to be helpful, she will let me know and I will put in a prescription to her pharmacy in [place].  RTO 3-4 months

## 2024-08-30 NOTE — PHYSICAL EXAM
[No Acute Distress] : in no acute distress [Well developed] : well developed [Well Nourished] : ~L well nourished [Good Hygeine] : demonstrates good hygeine [Oriented x3] : oriented to person, place, and time [Normal Memory] : ~T memory was ~L unimpaired [Normal Mood/Affect] : mood and affect are normal [Anxiety] : patient is anxious [No Edema] : ~T edema was not present [Obese] : obese [Normal Gait] : gait was normal [No Lesions] : no lesions were seen on the external genitalia [Labia Minora] : were normal [Normal Appearance] : general appearance was normal [Pink Rugae] : pink rugae [No Bleeding] : there was no active vaginal bleeding [Normal] : no abnormalities [Exam Deferred] : was deferred [Tenderness] : ~T no ~M abdominal tenderness observed [Distended] : not distended [de-identified] : Q-tip touch test negative. No erythema, ulcerations, fissures, erosions. [de-identified] : Mild hyperkeratosis b/l labia majora, no hair loss [FreeTextEntry4] : PFMs 2/4, with mild tender points L IC/C

## 2024-08-31 LAB
APPEARANCE: ABNORMAL
BACTERIA: ABNORMAL /HPF
BILIRUBIN URINE: NEGATIVE
BLOOD URINE: NEGATIVE
CAST: NORMAL /LPF
COLOR: YELLOW
EPITHELIAL CELLS: 14 /HPF
GLUCOSE QUALITATIVE U: NEGATIVE MG/DL
KETONES URINE: NEGATIVE MG/DL
LEUKOCYTE ESTERASE URINE: ABNORMAL
MICROSCOPIC-UA: NORMAL
NITRITE URINE: NEGATIVE
PH URINE: 6
PROTEIN URINE: NEGATIVE MG/DL
RED BLOOD CELLS URINE: 3 /HPF
REVIEW: NORMAL
SPECIFIC GRAVITY URINE: 1.02
UROBILINOGEN URINE: 0.2 MG/DL
WHITE BLOOD CELLS URINE: 1 /HPF

## 2024-09-03 ENCOUNTER — NON-APPOINTMENT (OUTPATIENT)
Age: 24
End: 2024-09-03

## 2024-09-03 DIAGNOSIS — B96.89 ACUTE VAGINITIS: ICD-10-CM

## 2024-09-03 DIAGNOSIS — N76.0 ACUTE VAGINITIS: ICD-10-CM

## 2024-09-03 RX ORDER — TINIDAZOLE 500 MG/1
500 TABLET, FILM COATED ORAL
Qty: 8 | Refills: 0 | Status: ACTIVE | COMMUNITY
Start: 2024-09-03 | End: 1900-01-01

## 2024-09-03 RX ORDER — FLUCONAZOLE 200 MG/1
200 TABLET ORAL
Qty: 1 | Refills: 0 | Status: ACTIVE | COMMUNITY
Start: 2024-09-03 | End: 1900-01-01

## 2024-09-04 LAB — BACTERIA UR CULT: ABNORMAL

## 2024-12-24 ENCOUNTER — APPOINTMENT (OUTPATIENT)
Dept: UROGYNECOLOGY | Facility: CLINIC | Age: 24
End: 2024-12-24
Payer: COMMERCIAL

## 2024-12-24 VITALS — SYSTOLIC BLOOD PRESSURE: 141 MMHG | DIASTOLIC BLOOD PRESSURE: 95 MMHG | HEART RATE: 98 BPM | OXYGEN SATURATION: 98 %

## 2024-12-24 DIAGNOSIS — L98.9 DISORDER OF THE SKIN AND SUBCUTANEOUS TISSUE, UNSPECIFIED: ICD-10-CM

## 2024-12-24 DIAGNOSIS — N94.819 VULVODYNIA, UNSPECIFIED: ICD-10-CM

## 2024-12-24 DIAGNOSIS — M62.89 OTHER SPECIFIED DISORDERS OF MUSCLE: ICD-10-CM

## 2024-12-24 DIAGNOSIS — M79.18 MYALGIA, OTHER SITE: ICD-10-CM

## 2024-12-24 DIAGNOSIS — N89.8 OTHER SPECIFIED NONINFLAMMATORY DISORDERS OF VAGINA: ICD-10-CM

## 2024-12-24 PROCEDURE — 99213 OFFICE O/P EST LOW 20 MIN: CPT

## 2025-01-07 ENCOUNTER — NON-APPOINTMENT (OUTPATIENT)
Age: 25
End: 2025-01-07

## 2025-03-26 ENCOUNTER — APPOINTMENT (OUTPATIENT)
Dept: UROGYNECOLOGY | Facility: CLINIC | Age: 25
End: 2025-03-26
Payer: COMMERCIAL

## 2025-03-26 ENCOUNTER — LABORATORY RESULT (OUTPATIENT)
Age: 25
End: 2025-03-26

## 2025-03-26 VITALS
TEMPERATURE: 97.9 F | BODY MASS INDEX: 46.61 KG/M2 | HEART RATE: 109 BPM | RESPIRATION RATE: 16 BRPM | DIASTOLIC BLOOD PRESSURE: 67 MMHG | WEIGHT: 290 LBS | HEIGHT: 66 IN | OXYGEN SATURATION: 98 % | SYSTOLIC BLOOD PRESSURE: 138 MMHG

## 2025-03-26 DIAGNOSIS — M79.18 MYALGIA, OTHER SITE: ICD-10-CM

## 2025-03-26 DIAGNOSIS — L98.9 DISORDER OF THE SKIN AND SUBCUTANEOUS TISSUE, UNSPECIFIED: ICD-10-CM

## 2025-03-26 DIAGNOSIS — M62.89 OTHER SPECIFIED DISORDERS OF MUSCLE: ICD-10-CM

## 2025-03-26 DIAGNOSIS — Z11.51 ENCOUNTER FOR SCREENING FOR HUMAN PAPILLOMAVIRUS (HPV): ICD-10-CM

## 2025-03-26 DIAGNOSIS — N94.819 VULVODYNIA, UNSPECIFIED: ICD-10-CM

## 2025-03-26 PROCEDURE — 99214 OFFICE O/P EST MOD 30 MIN: CPT

## 2025-03-28 ENCOUNTER — APPOINTMENT (OUTPATIENT)
Dept: UROGYNECOLOGY | Facility: CLINIC | Age: 25
End: 2025-03-28

## 2025-04-01 ENCOUNTER — NON-APPOINTMENT (OUTPATIENT)
Age: 25
End: 2025-04-01

## 2025-04-01 LAB
APPEARANCE: ABNORMAL
BILIRUBIN URINE: NEGATIVE
BLOOD URINE: ABNORMAL
CANDIDA VAG CYTO: NOT DETECTED
COLOR: YELLOW
CYTOLOGY CVX/VAG DOC THIN PREP: NORMAL
G VAGINALIS+PREV SP MTYP VAG QL MICRO: NOT DETECTED
GLUCOSE QUALITATIVE U: NEGATIVE MG/DL
HPV 16 E6+E7 MRNA CVX QL NAA+PROBE: NOT DETECTED
HPV18+45 E6+E7 MRNA CVX QL NAA+PROBE: NOT DETECTED
KETONES URINE: NEGATIVE MG/DL
LEUKOCYTE ESTERASE URINE: NEGATIVE
NITRITE URINE: NEGATIVE
PH URINE: 6
PROTEIN URINE: NORMAL MG/DL
SPECIFIC GRAVITY URINE: 1.03
T VAGINALIS VAG QL WET PREP: NOT DETECTED
UROBILINOGEN URINE: 0.2 MG/DL

## 2025-05-14 ENCOUNTER — APPOINTMENT (OUTPATIENT)
Dept: UROGYNECOLOGY | Facility: CLINIC | Age: 25
End: 2025-05-14
Payer: COMMERCIAL

## 2025-05-14 VITALS — SYSTOLIC BLOOD PRESSURE: 133 MMHG | DIASTOLIC BLOOD PRESSURE: 71 MMHG | HEART RATE: 87 BPM | TEMPERATURE: 98.1 F

## 2025-05-14 DIAGNOSIS — M62.89 OTHER SPECIFIED DISORDERS OF MUSCLE: ICD-10-CM

## 2025-05-14 DIAGNOSIS — N94.10 UNSPECIFIED DYSPAREUNIA: ICD-10-CM

## 2025-05-14 DIAGNOSIS — N92.6 IRREGULAR MENSTRUATION, UNSPECIFIED: ICD-10-CM

## 2025-05-14 DIAGNOSIS — N94.819 VULVODYNIA, UNSPECIFIED: ICD-10-CM

## 2025-05-14 DIAGNOSIS — M79.18 MYALGIA, OTHER SITE: ICD-10-CM

## 2025-05-14 DIAGNOSIS — N89.8 OTHER SPECIFIED NONINFLAMMATORY DISORDERS OF VAGINA: ICD-10-CM

## 2025-05-14 PROCEDURE — 99214 OFFICE O/P EST MOD 30 MIN: CPT
